# Patient Record
Sex: FEMALE | Race: WHITE | NOT HISPANIC OR LATINO | URBAN - METROPOLITAN AREA
[De-identification: names, ages, dates, MRNs, and addresses within clinical notes are randomized per-mention and may not be internally consistent; named-entity substitution may affect disease eponyms.]

---

## 2022-02-14 ENCOUNTER — OFFICE VISIT (OUTPATIENT)
Dept: URBAN - METROPOLITAN AREA CLINIC 48 | Facility: CLINIC | Age: 22
End: 2022-02-14
Payer: COMMERCIAL

## 2022-02-14 VITALS
HEIGHT: 67 IN | HEART RATE: 101 BPM | DIASTOLIC BLOOD PRESSURE: 95 MMHG | TEMPERATURE: 98.1 F | WEIGHT: 182.4 LBS | SYSTOLIC BLOOD PRESSURE: 158 MMHG | BODY MASS INDEX: 28.63 KG/M2

## 2022-02-14 DIAGNOSIS — K62.89 RECTAL PAIN: ICD-10-CM

## 2022-02-14 DIAGNOSIS — K62.5 RECTAL BLEEDING: ICD-10-CM

## 2022-02-14 DIAGNOSIS — K60.2 ANAL FISSURE: ICD-10-CM

## 2022-02-14 DIAGNOSIS — K59.01 SLOW TRANSIT CONSTIPATION: ICD-10-CM

## 2022-02-14 DIAGNOSIS — R10.30 LOWER ABDOMINAL PAIN: ICD-10-CM

## 2022-02-14 PROCEDURE — 99204 OFFICE O/P NEW MOD 45 MIN: CPT | Performed by: INTERNAL MEDICINE

## 2022-02-14 NOTE — HPI-TODAY'S VISIT:
21 year old female presents for evaluation of rectal pain and rectal bleeding. At times she has lower abdominal pain that is often relieved after a bowel movement. Rectal pain feels like a burn when she has a bowel movement. Rectal bleeding has occurred intermittently for 3.5 years but in the last 6 months it has worsened with bright red blood in the toilet and on the tissue. Bowel movements are every other day of hard stool for which she started stool softener daily and stools have softened. In 2018, she had rectal pain and bleeding at which time she had a colonoscopy per Dr. De Los Santos which showed an anal fissure and was given nitro cream. At that time it helped with pain and bleeding. Two months ago, her PCP gave her a hemorrhoid cream and it has helped with pain but not bleeding. Her weight has been steady.

## 2022-02-14 NOTE — PHYSICAL EXAM GASTROINTESTINAL
Abdomen , soft, nontender, nondistended , no guarding or rigidity , no masses palpable , normal bowel sounds , Liver and Spleen , no hepatomegaly present , no hepatosplenomegaly , liver nontender , spleen not palpable , Rectal bun with palpation. normal sphincter tone , no internal hemorrhoids, rectal masses or bleeding present

## 2022-02-28 ENCOUNTER — OFFICE VISIT (OUTPATIENT)
Dept: URBAN - METROPOLITAN AREA CLINIC 44 | Facility: CLINIC | Age: 22
End: 2022-02-28

## 2022-04-04 ENCOUNTER — OFFICE VISIT (OUTPATIENT)
Dept: URBAN - METROPOLITAN AREA CLINIC 48 | Facility: CLINIC | Age: 22
End: 2022-04-04
Payer: COMMERCIAL

## 2022-04-04 VITALS
HEIGHT: 67 IN | SYSTOLIC BLOOD PRESSURE: 122 MMHG | TEMPERATURE: 99.1 F | WEIGHT: 180 LBS | BODY MASS INDEX: 28.25 KG/M2 | DIASTOLIC BLOOD PRESSURE: 85 MMHG | HEART RATE: 86 BPM

## 2022-04-04 DIAGNOSIS — R10.30 LOWER ABDOMINAL PAIN: ICD-10-CM

## 2022-04-04 DIAGNOSIS — K62.89 RECTAL PAIN: ICD-10-CM

## 2022-04-04 DIAGNOSIS — K60.2 ANAL FISSURE: ICD-10-CM

## 2022-04-04 DIAGNOSIS — K62.5 RECTAL BLEEDING: ICD-10-CM

## 2022-04-04 DIAGNOSIS — K59.01 SLOW TRANSIT CONSTIPATION: ICD-10-CM

## 2022-04-04 PROBLEM — 30037006: Status: ACTIVE | Noted: 2022-02-14

## 2022-04-04 PROBLEM — 12063002: Status: ACTIVE | Noted: 2022-02-14

## 2022-04-04 PROBLEM — 77880009: Status: ACTIVE | Noted: 2022-02-14

## 2022-04-04 PROBLEM — 35298007: Status: ACTIVE | Noted: 2022-02-14

## 2022-04-04 PROBLEM — 54586004: Status: ACTIVE | Noted: 2022-02-14

## 2022-04-04 PROCEDURE — 99214 OFFICE O/P EST MOD 30 MIN: CPT | Performed by: INTERNAL MEDICINE

## 2022-04-04 NOTE — HPI-TODAY'S VISIT:
21 year old female presents for follow up of rectal pain and bleeding. At her last visit, she was given Nitro cream which she used and rectal pain and bleeding has resolved. She has a history of rectal bleeding that has occurred intermittently for 3.5 years. Bowel movements were every other day of hard stool but has improved on daily Miralax and fiber. In 2018, she had rectal pain and bleeding at which time she had a colonoscopy per Dr. De Los Santos which showed an anal fissure and was given nitro cream. Overall, she is much improved.

## 2022-05-19 ENCOUNTER — HOSPITAL ENCOUNTER (OUTPATIENT)
Dept: HOSPITAL 5 - OR | Age: 22
Discharge: HOME | End: 2022-05-19
Attending: ORTHOPAEDIC SURGERY
Payer: COMMERCIAL

## 2022-05-19 VITALS — SYSTOLIC BLOOD PRESSURE: 115 MMHG | DIASTOLIC BLOOD PRESSURE: 83 MMHG

## 2022-05-19 DIAGNOSIS — Z98.890: ICD-10-CM
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
## 2022-05-19 DIAGNOSIS — S83.282A: Primary | ICD-10-CM

## 2022-05-19 DIAGNOSIS — M65.88: ICD-10-CM

## 2022-05-19 DIAGNOSIS — Z72.89: ICD-10-CM

## 2022-05-19 PROCEDURE — 81025 URINE PREGNANCY TEST: CPT

## 2022-05-19 PROCEDURE — 29882 ARTHRS KNE SRG MNISC RPR M/L: CPT

## 2022-05-19 PROCEDURE — C1713 ANCHOR/SCREW BN/BN,TIS/BN: HCPCS

## 2022-05-19 NOTE — OPERATIVE REPORT
Operative Report


Operative Report: 





Pre-op diagnosis: Left knee lateral meniscus tear


Postop diagnosis: Same


Procedure: Left knee lateral meniscus repair


Surgeon: Dr. Mariano Simmons


Assistant: None


Anesthesia: General


Findings: Tear of the posterior horn of the lateral meniscus medial to the 

popliteus tendon, previous ACL reconstruction


Specimens none


Drains none


Tourniquet time: 25 minutes








Indications: Patient is a 21-year-old female who had previous ACL reconstruction

she injured her left knee.  Evaluation work-up was suggestive of a lateral 

meniscus tear.  Recommended patient undergo surgical management.  Risk benefits 

and limitations of surgery were discussed with the patient including bleeding 

infection injury to nerves or blood vessels and need for reoperation.  Patient 

agreed and consented to undergo surgery.


Technique: In the preop holding area the site was marked with a surgical marking

pen.  The extremity was prepped and draped in sterile fashion and a supine 

position.  Standard anteromedial and anterolateral portals were placed and 

diagnostic arthroscopy was performed after inflating the tourniquet to 300 mmHg.

 Diagnostic arthroscopy revealed normal patellofemoral medial and lateral 

articular cartilage.  The previous ACL reconstruction was visualized the ACL was

noted to be intact.  There was some synovitis in the anterior horn of the 

lateral meniscus.  The lateral meniscus was visualized there was noted to be a 

tear of the posterior horn it would appear to be a high-grade partial tear in 

the posterior aspect of the meniscus however there was dramatic instability in 

the posterior aspect of the meniscus as the meniscus did pull into the joint 

with probing.  Decision was made to perform an arthroscopic lateral meniscus 

repair.  Utilizing a sequent meniscal repair implant 4 sutures were placed into 

the meniscus.  Care was taken posteriorly not to penetrate the capsule.  

Multiple sutures were placed around the periphery of the meniscus in order to 

stabilize it in the posterior aspect the joint.





The remaining portion of the knee appear to be normal the arthroscope was then 

removed and incisions were closed with 3-0 nylon.  Portal sites were injected 

with 0.25% Marcaine.  Sterile dressing was applied patient was awakened and 

taken to recovery room in stable condition.





Postop plan patient will be nonweightbearing for 1 week we will work with the 

physical therapist on range of motion exercises we we will have the patient wear

a brace for 6 weeks we will see her back for follow-up visit in 2 weeks time

## 2024-01-24 ENCOUNTER — DASHBOARD ENCOUNTERS (OUTPATIENT)
Age: 24
End: 2024-01-24

## 2024-01-30 ENCOUNTER — OFFICE VISIT (OUTPATIENT)
Dept: URBAN - METROPOLITAN AREA CLINIC 48 | Facility: CLINIC | Age: 24
End: 2024-01-30
Payer: COMMERCIAL

## 2024-01-30 ENCOUNTER — LAB OUTSIDE AN ENCOUNTER (OUTPATIENT)
Dept: URBAN - METROPOLITAN AREA CLINIC 48 | Facility: CLINIC | Age: 24
End: 2024-01-30

## 2024-01-30 VITALS
SYSTOLIC BLOOD PRESSURE: 127 MMHG | HEIGHT: 67 IN | DIASTOLIC BLOOD PRESSURE: 82 MMHG | WEIGHT: 190.6 LBS | TEMPERATURE: 98.6 F | HEART RATE: 81 BPM | BODY MASS INDEX: 29.91 KG/M2

## 2024-01-30 DIAGNOSIS — R19.4 CHANGE IN BOWEL HABIT: ICD-10-CM

## 2024-01-30 DIAGNOSIS — R10.30 LOWER ABDOMINAL PAIN: ICD-10-CM

## 2024-01-30 PROBLEM — 88111009: Status: ACTIVE | Noted: 2024-01-30

## 2024-01-30 PROCEDURE — 99214 OFFICE O/P EST MOD 30 MIN: CPT | Performed by: INTERNAL MEDICINE

## 2024-01-30 NOTE — HPI-TODAY'S VISIT:
23 year old female presents for evaluation of lower abdominal pain. Pain is at least once a week and she has not noted triggers. Pain will be intense and feel like a stab that will often waker her up. Having a bowel movement helps some. Stool is formed followed by diarrhea.    In 2018, she had rectal pain and bleeding at which time she had a colonoscopy per Dr. De Los Santos which showed an anal fissure. Nitro cream was prescribed and now denies having rectal pain or bleeding. The patient used to use Linzess or stool softeners but does not use now due to stool being too soft. Her cousin has Crohn's and her grandmother has rhematoid arthritis.

## 2024-01-30 NOTE — PHYSICAL EXAM GASTROINTESTINAL
Abdomen , soft, mild lower abdominal tender, nondistended , no guarding or rigidity , no masses palpable , normal bowel sounds , Liver and Spleen , no hepatomegaly present , no hepatosplenomegaly , liver nontender , spleen not palpable

## 2024-02-12 ENCOUNTER — LAB (OUTPATIENT)
Dept: URBAN - METROPOLITAN AREA CLINIC 48 | Facility: CLINIC | Age: 24
End: 2024-02-12

## 2024-03-06 ENCOUNTER — COLON (OUTPATIENT)
Dept: URBAN - METROPOLITAN AREA SURGERY CENTER 28 | Facility: SURGERY CENTER | Age: 24
End: 2024-03-06
Payer: COMMERCIAL

## 2024-03-06 DIAGNOSIS — K92.1 ACUTE MELENA: ICD-10-CM

## 2024-03-06 PROCEDURE — 45378 DIAGNOSTIC COLONOSCOPY: CPT | Performed by: INTERNAL MEDICINE

## 2024-04-22 ENCOUNTER — OV EP (OUTPATIENT)
Dept: URBAN - METROPOLITAN AREA CLINIC 48 | Facility: CLINIC | Age: 24
End: 2024-04-22